# Patient Record
Sex: FEMALE | Race: WHITE
[De-identification: names, ages, dates, MRNs, and addresses within clinical notes are randomized per-mention and may not be internally consistent; named-entity substitution may affect disease eponyms.]

---

## 2020-02-02 ENCOUNTER — HOSPITAL ENCOUNTER (EMERGENCY)
Dept: HOSPITAL 72 - EMR | Age: 30
Discharge: HOME | End: 2020-02-02
Payer: SELF-PAY

## 2020-02-02 VITALS — DIASTOLIC BLOOD PRESSURE: 65 MMHG | SYSTOLIC BLOOD PRESSURE: 105 MMHG

## 2020-02-02 VITALS — WEIGHT: 107 LBS | BODY MASS INDEX: 16.79 KG/M2 | HEIGHT: 67 IN

## 2020-02-02 VITALS — DIASTOLIC BLOOD PRESSURE: 72 MMHG | SYSTOLIC BLOOD PRESSURE: 110 MMHG

## 2020-02-02 DIAGNOSIS — J02.9: Primary | ICD-10-CM

## 2020-02-02 PROCEDURE — 99282 EMERGENCY DEPT VISIT SF MDM: CPT

## 2020-02-02 NOTE — NUR
ED Nurse Note:



Pt ambulated to ED d/t flu-like symptoms, headache and sorethroat x 3 days. 
placed on bed.

## 2020-02-02 NOTE — EMERGENCY ROOM REPORT
History of Present Illness


General


Chief Complaint:  Flu Like Symptoms


Source:  Patient





Present Illness


HPI


Patient is a 29-year-old female presents after increased sore throat.  Patient 

reports having increased discomfort with swallowing for approximately 2 days.  

Reports having some increased generalized body aches.  Denies any vomiting or 

diarrhea.  Denies any cough.  She does report vaping.  Denies any difficulty 

with breathing.  Moderate headache.


Allergies:  


Coded Allergies:  


     No Known Allergies (Unverified , 20)





Patient History


Past Medical History:  see triage record


Last Menstrual Period:  20


Pregnant Now:  No


:  0


Para:  0


Reviewed Nursing Documentation:  PMH: Agreed; PSxH: Agreed





Nursing Documentation-PMH


Past Medical History:  No Stated History





Review of Systems


All Other Systems:  negative except mentioned in HPI





Physical Exam





Vital Signs








  Date Time  Temp Pulse Resp B/P (MAP) Pulse Ox O2 Delivery O2 Flow Rate FiO2


 


20 10:44 99.9 85 18 105/65 (78) 98 Room Air  








Sp02 EP Interpretation:  reviewed, normal


General Appearance:  normal inspection, well appearing, no apparent distress, 

alert, GCS 15


Head:  atraumatic


ENT:  normal ENT inspection, hearing grossly normal, normal voice, tonsillar 

swelling, tonsillar exudate


Neck:  normal inspection, full range of motion, supple, no bony tend


Respiratory:  normal inspection, lungs clear, normal breath sounds, no 

respiratory distress, no retraction, no wheezing


Cardiovascular #1:  regular rate, rhythm, no edema


Gastrointestinal:  normal inspection, normal bowel sounds, non tender, soft, no 

guarding, no hernia


Genitourinary:  no CVA tenderness


Musculoskeletal:  normal inspection, back normal, normal range of motion


Neurologic:  alert, motor strength/tone normal, CNs III-XII nml as tested, 

oriented x3, responsive, speech normal, normal inspection


Psychiatric:  normal inspection, judgement/insight normal, mood/affect normal





Medical Decision Making


Diagnostic Impression:  


 Primary Impression:  


 Exudative pharyngitis


ER Course


Patient present for tonsillar swelling.  Differential diagnosis included but 

was not limited to meningitis, exudative tonsillitis, retropharyngeal abscess, 

epiglottitis, strep pharyngitis.  Patient appears to have some evidence of 

exudative tonsillitis and has some tender lymphadenopathy in the left side of 

her neck.  Patient will be treated empirically with antibiotics.  She is given 

prescription for ibuprofen.  Advised to follow-up with her primary care 

physician for recheck.  The patient is advised to follow up with  primary care 

doctor in 1-2 days.  Patient is advised to return if any worsening condition or 

if any changes in status that are concerning.





This report is dictated with Dragon transcription software which may 

occasionally lead to discrepancies related to use of this software.





Last Vital Signs








  Date Time  Temp Pulse Resp B/P (MAP) Pulse Ox O2 Delivery O2 Flow Rate FiO2


 


20 10:44 99.9 85 18 105/65 (78) 98 Room Air  








Status:  improved


Disposition:  HOME, SELF-CARE


Condition:  Stable


Scripts


Azithromycin* (ZITHROMAX*) 250 Mg Tablet


250 MG ORAL DAILY, #6 TAB 0 Refills


   Take two tables once daily for 1 day, then one tablet once daily for 4


   days.


   Prov: Rangel Duran MD         20 


Ibuprofen* (MOTRIN*) 400 Mg Tablet


400 MG ORAL Q8H, #30 TAB 0 Refills


   Prov: Rangel Duran MD         20











Rangel Duran MD 2020 11:13

## 2020-02-02 NOTE — NUR
ER DISCHARGE NOTE:



Pt is cleared to be discharged per ERMD, pt is AOx4, VSS, on RA. pt was given 
dc and prescription instructions, pt was able to verbalize understanding, pt id 
band removed. pt is able to ambulate with steady gait. pt took all belongings.

## 2020-02-04 ENCOUNTER — HOSPITAL ENCOUNTER (EMERGENCY)
Dept: HOSPITAL 72 - EMR | Age: 30
Discharge: HOME | End: 2020-02-04
Payer: SELF-PAY

## 2020-02-04 VITALS — DIASTOLIC BLOOD PRESSURE: 62 MMHG | SYSTOLIC BLOOD PRESSURE: 99 MMHG

## 2020-02-04 VITALS — BODY MASS INDEX: 16.79 KG/M2 | WEIGHT: 107 LBS | HEIGHT: 67 IN

## 2020-02-04 VITALS — SYSTOLIC BLOOD PRESSURE: 99 MMHG | DIASTOLIC BLOOD PRESSURE: 62 MMHG

## 2020-02-04 DIAGNOSIS — J03.90: Primary | ICD-10-CM

## 2020-02-04 PROCEDURE — 86710 INFLUENZA VIRUS ANTIBODY: CPT

## 2020-02-04 PROCEDURE — 99282 EMERGENCY DEPT VISIT SF MDM: CPT

## 2020-02-04 PROCEDURE — 87070 CULTURE OTHR SPECIMN AEROBIC: CPT

## 2020-02-04 NOTE — NUR
ED Nurse Note:



Pt ambulated to ED d/t tonsilitis x 5 days. Pt came in to ER two days ago and 
pt started on ATB but pain is getting worse. Pt is AOx4, VSS, on RA, NAD. 
Placed on bed.

## 2020-02-04 NOTE — EMERGENCY ROOM REPORT
History of Present Illness


General


Chief Complaint:  Sore Throat


Source:  Patient





Present Illness


HPI


Patient is a 29-year-old female who presents after increased throat discomfort.

  Patient had recently been seen at this emergency department by me.  She had 

worsening pain to the throat after several days.  Reports having some increased 

welling to the right side of her tonsils.  Denies any vomiting.  Reports having 

continued white discoloration to the right tonsil.  Persistent pain with 

swallowing.  Reports having subjective fever and chills.  Nonproductive cough.


Allergies:  


Coded Allergies:  


     No Known Allergies (Unverified , 2/2/20)





Patient History


Past Medical History:  see triage record


Last Menstrual Period:  01/20/20


Pregnant Now:  No


Reviewed Nursing Documentation:  PMH: Agreed; PSxH: Agreed





Nursing Documentation-PMH


Past Medical History:  No Stated History





Review of Systems


All Other Systems:  negative except mentioned in HPI





Physical Exam





Vital Signs








  Date Time  Temp Pulse Resp B/P (MAP) Pulse Ox O2 Delivery O2 Flow Rate FiO2


 


2/4/20 10:23 99.3 83 19 99/62 (74) 100 Room Air  








General Appearance:  well appearing, no apparent distress, alert, GCS 15


Head:  normocephalic, atraumatic


ENT:  hearing grossly normal, normal voice, TMs + canals normal, uvula midline, 

tonsillar exudate - Right-sided tonsillar exudate and enlargement, no  uvular 

deviation


Neck:  full range of motion, supple


Respiratory:  no respiratory distress, speaking full sentences


Cardiovascular #1:  normal inspection, regular rate, rhythm


Gastrointestinal:  normal inspection


Musculoskeletal:  normal inspection, no calf tenderness


Neurologic:  alert, motor strength/tone normal, CNs III-XII nml as tested, 

oriented x3, normal gait


Psychiatric:  mood/affect normal


Skin:  normal inspection, normal color, no rash





Medical Decision Making


Diagnostic Impression:  


 Primary Impression:  


 Tonsillitis


ER Course


Patient presented for sore throat.  Differential diagnosis included but was not 

limited to meningitis, exudative tonsillitis, retropharyngeal abscess, 

epiglottitis, strep pharyngitis.  Patient tonsils are asymmetrically swollen 

but there is no evident abscess at this time. Throat culture was ordered.  

Patient does not appear to have any respiratory distress or evidence of 

abscess.  Patient stable for outpatient management. Patient will be given 

prescription for Augmentin.  She advised to continue salt water gargling as 

well as to follow-up for recheck in 2 days.  The patient is advised to follow 

up with  primary care doctor in 2 days.  Patient is advised to return if any 

worsening condition or if any changes in status that are concerning.





This report is dictated with Dragon transcription software which may 

occasionally lead to discrepancies related to use of this software.





Last Vital Signs








  Date Time  Temp Pulse Resp B/P (MAP) Pulse Ox O2 Delivery O2 Flow Rate FiO2


 


2/4/20 10:30 99.3 68 19 99/62 100 Room Air  








Status:  improved


Disposition:  HOME, SELF-CARE


Condition:  Stable


Scripts


Amoxicillin/Potassium Clav 875-125* (AUGMENTIN 875-125 TABLET*) 1 Each Tablet


1 TAB ORAL TWICE A DAY, #14 TAB


   Prov: Rangel Duran MD         2/4/20


Patient Instructions:  Tonsillitis











Rangel Duran MD Feb 4, 2020 10:55

## 2020-02-04 NOTE — NUR
ER DISCHARGE NOTE:



Pt is cleared to be discharged per ERMD. Pt is AOx4, VSS, on RA. pt was given 
dc and prescription instructions, pt was able to verbalize understanding, pt id 
band removed. pt is able to ambulate with steady gait. pt took all belongings.